# Patient Record
Sex: FEMALE | Race: WHITE | HISPANIC OR LATINO
[De-identification: names, ages, dates, MRNs, and addresses within clinical notes are randomized per-mention and may not be internally consistent; named-entity substitution may affect disease eponyms.]

---

## 2022-06-17 PROBLEM — Z00.00 ENCOUNTER FOR PREVENTIVE HEALTH EXAMINATION: Status: ACTIVE | Noted: 2022-06-17

## 2022-06-20 ENCOUNTER — NON-APPOINTMENT (OUTPATIENT)
Age: 32
End: 2022-06-20

## 2022-06-20 ENCOUNTER — APPOINTMENT (OUTPATIENT)
Dept: CARDIOLOGY | Facility: CLINIC | Age: 32
End: 2022-06-20
Payer: MEDICAID

## 2022-06-20 VITALS
DIASTOLIC BLOOD PRESSURE: 64 MMHG | RESPIRATION RATE: 16 BRPM | SYSTOLIC BLOOD PRESSURE: 102 MMHG | TEMPERATURE: 97.5 F | OXYGEN SATURATION: 99 % | HEART RATE: 77 BPM

## 2022-06-20 VITALS — HEIGHT: 62 IN | WEIGHT: 120 LBS | BODY MASS INDEX: 22.08 KG/M2

## 2022-06-20 DIAGNOSIS — E78.5 HYPERLIPIDEMIA, UNSPECIFIED: ICD-10-CM

## 2022-06-20 DIAGNOSIS — Z78.9 OTHER SPECIFIED HEALTH STATUS: ICD-10-CM

## 2022-06-20 PROCEDURE — 93000 ELECTROCARDIOGRAM COMPLETE: CPT

## 2022-06-20 PROCEDURE — 99204 OFFICE O/P NEW MOD 45 MIN: CPT | Mod: 25

## 2022-06-20 PROCEDURE — 36415 COLL VENOUS BLD VENIPUNCTURE: CPT

## 2022-06-20 RX ORDER — OMEPRAZOLE 40 MG/1
40 CAPSULE, DELAYED RELEASE ORAL
Qty: 30 | Refills: 0 | Status: ACTIVE | COMMUNITY
Start: 2022-03-30

## 2022-06-20 NOTE — REVIEW OF SYSTEMS
[Dyspnea on exertion] : dyspnea during exertion [Chest Discomfort] : chest discomfort [Negative] : Heme/Lymph [SOB] : no shortness of breath [Lower Ext Edema] : no extremity edema [Leg Claudication] : no intermittent leg claudication [Palpitations] : no palpitations [Orthopnea] : no orthopnea [PND] : no PND [Syncope] : no syncope

## 2022-06-20 NOTE — DISCUSSION/SUMMARY
[EKG obtained to assist in diagnosis and management of assessed problem(s)] : EKG obtained to assist in diagnosis and management of assessed problem(s) [FreeTextEntry1] : atypical chest symptoms in a patient with new KOLB, cardiac risk enhancers include HLD and fam hx of both CAD and cardiomyopathy at a young age\par \par -check lipid profile, lipoprotein (a) for additional risk stratification\par -check TTE to evaluate for any structural heart disease\par -check stress test to evaluate for exercise tolerance and to evaluate for cardiac ischemia

## 2022-06-20 NOTE — HISTORY OF PRESENT ILLNESS
[FreeTextEntry1] : PCP: Fanta Frederick NP\par \par 32F HLD, gastritis who presents for evaluation of chest pressure.\par \par had episode of chest tightness/pressure, felt moreso at night. No SOB, has a hx of prolonged palpitations 3 years prior - had some tests which were without any significant findings.\par \par sometimes with abnl sensations in the extremities. no orthopnea, no LE edema\par she reports new KOLB with reduced ET - can climb one flight of stairs but notes significant non-exercise limiting SOB\par \par Born in [facility], emigrated age 12\par \par Fam dz:\par Father's side - heart dz, CAD\par Father - CAD\par Pat uncle - PPM\par grandmother fatal MI ~ age 40\par cousin -  34 dilated cardiomyopathy\par \par Pregnancy hx:\par none

## 2022-06-20 NOTE — PHYSICAL EXAM
[Soft] : abdomen soft [Non Tender] : non-tender [Normal] : alert and oriented, normal memory [de-identified] : no arcus [de-identified] : no tendinous xanthoma

## 2022-06-24 ENCOUNTER — MESSAGE (OUTPATIENT)
Age: 32
End: 2022-06-24

## 2022-06-24 LAB
APO LP(A) SERPL-MCNC: <9 NMOL/L
CHOLEST SERPL-MCNC: 167 MG/DL
HDLC SERPL-MCNC: 49 MG/DL
LDLC SERPL CALC-MCNC: 91 MG/DL
NONHDLC SERPL-MCNC: 118 MG/DL
TRIGL SERPL-MCNC: 135 MG/DL

## 2022-06-27 ENCOUNTER — APPOINTMENT (OUTPATIENT)
Dept: CARDIOLOGY | Facility: CLINIC | Age: 32
End: 2022-06-27
Payer: MEDICAID

## 2022-06-27 PROCEDURE — 93015 CV STRESS TEST SUPVJ I&R: CPT

## 2022-06-27 PROCEDURE — 93306 TTE W/DOPPLER COMPLETE: CPT

## 2022-06-27 PROCEDURE — ZZZZZ: CPT

## 2022-07-19 ENCOUNTER — APPOINTMENT (OUTPATIENT)
Dept: NEUROLOGY | Facility: CLINIC | Age: 32
End: 2022-07-19

## 2024-05-21 ENCOUNTER — APPOINTMENT (OUTPATIENT)
Dept: CARDIOLOGY | Facility: CLINIC | Age: 34
End: 2024-05-21
Payer: MEDICAID

## 2024-05-21 ENCOUNTER — NON-APPOINTMENT (OUTPATIENT)
Age: 34
End: 2024-05-21

## 2024-05-21 VITALS
DIASTOLIC BLOOD PRESSURE: 64 MMHG | RESPIRATION RATE: 16 BRPM | HEART RATE: 78 BPM | OXYGEN SATURATION: 97 % | TEMPERATURE: 98.3 F | SYSTOLIC BLOOD PRESSURE: 92 MMHG

## 2024-05-21 DIAGNOSIS — Z82.49 FAMILY HISTORY OF ISCHEMIC HEART DISEASE AND OTHER DISEASES OF THE CIRCULATORY SYSTEM: ICD-10-CM

## 2024-05-21 DIAGNOSIS — R07.89 OTHER CHEST PAIN: ICD-10-CM

## 2024-05-21 DIAGNOSIS — R06.09 OTHER FORMS OF DYSPNEA: ICD-10-CM

## 2024-05-21 DIAGNOSIS — R60.0 LOCALIZED EDEMA: ICD-10-CM

## 2024-05-21 PROCEDURE — 93000 ELECTROCARDIOGRAM COMPLETE: CPT

## 2024-05-21 PROCEDURE — 99214 OFFICE O/P EST MOD 30 MIN: CPT | Mod: 25

## 2024-06-03 NOTE — HISTORY OF PRESENT ILLNESS
[FreeTextEntry1] : PCP: Fanta Frederick, NP  34F HLD, gastritis who presents for cardiac follow-up  now with recurrent some episodes of chest pressure, moreso with emotional stress relatively sedentary, does not know if exertional  sometimes with abnl sensations in the extremities. no orthopnea, no LE edema she reports new KOLB with reduced ET - can climb one flight of stairs but notes significant non-exercise limiting SOB has some pedal edema, not necessarily worse at the end of the time  on OCP  22: Chol 167//HDL 49/LDL 91 Lp(a) neg   Born in [facility], emigrated age 12  Fam dz: Father's side - heart dz, CAD Father - CAD Pat uncle - PPM grandmother fatal MI ~ age 40 cousin -  34 dilated cardiomyopathy additional fam members with dilated cardiomyopathy  Pregnancy hx: Two children - uncomplicated pregnancies

## 2024-06-03 NOTE — REVIEW OF SYSTEMS
[Dyspnea on exertion] : dyspnea during exertion [Chest Discomfort] : chest discomfort [Negative] : Heme/Lymph [Lower Ext Edema] : lower extremity edema [Palpitations] : palpitations [SOB] : no shortness of breath [Leg Claudication] : no intermittent leg claudication [Orthopnea] : no orthopnea [PND] : no PND [Syncope] : no syncope

## 2024-06-03 NOTE — CARDIOLOGY SUMMARY
[de-identified] : 5/21/24: SR, low voltage precordial leads 6/20/22: NSR [de-identified] : 6/27/22:  [facility] protocol 9:00 No ischemia [de-identified] : 6/27/22: EF 60-65%

## 2024-06-03 NOTE — DISCUSSION/SUMMARY
[EKG obtained to assist in diagnosis and management of assessed problem(s)] : EKG obtained to assist in diagnosis and management of assessed problem(s) [FreeTextEntry1] : Given new sx, will check check TTE to evaluate for any structural heart disease and screen for cardiomyopathy givne strong family hx -check stress test to evaluate for evaluate for cardiac ischemia

## 2024-06-03 NOTE — PHYSICAL EXAM
[Soft] : abdomen soft [Non Tender] : non-tender [Normal] : alert and oriented, normal memory [de-identified] : no arcus [de-identified] : no tendinous xanthoma

## 2024-06-10 ENCOUNTER — APPOINTMENT (OUTPATIENT)
Dept: CARDIOLOGY | Facility: CLINIC | Age: 34
End: 2024-06-10
Payer: MEDICAID

## 2024-06-10 PROCEDURE — 93306 TTE W/DOPPLER COMPLETE: CPT

## 2024-06-16 ENCOUNTER — MESSAGE (OUTPATIENT)
Age: 34
End: 2024-06-16

## 2024-07-12 ENCOUNTER — APPOINTMENT (OUTPATIENT)
Dept: CARDIOLOGY | Facility: CLINIC | Age: 34
End: 2024-07-12
Payer: MEDICAID

## 2024-07-12 PROCEDURE — ZZZZZ: CPT | Mod: NC

## 2024-07-12 PROCEDURE — 93015 CV STRESS TEST SUPVJ I&R: CPT
